# Patient Record
Sex: MALE | Race: BLACK OR AFRICAN AMERICAN | NOT HISPANIC OR LATINO | URBAN - METROPOLITAN AREA
[De-identification: names, ages, dates, MRNs, and addresses within clinical notes are randomized per-mention and may not be internally consistent; named-entity substitution may affect disease eponyms.]

---
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
## 2023-02-21 LAB — SICKLE SCREEN: NEGATIVE

## 2023-03-21 ENCOUNTER — FORM ENCOUNTER (OUTPATIENT)
Age: 18
End: 2023-03-21

## 2023-03-22 ENCOUNTER — APPOINTMENT (OUTPATIENT)
Dept: ORTHOPEDIC SURGERY | Facility: CLINIC | Age: 18
End: 2023-03-22
Payer: COMMERCIAL

## 2023-03-22 ENCOUNTER — APPOINTMENT (OUTPATIENT)
Dept: MRI IMAGING | Facility: CLINIC | Age: 18
End: 2023-03-22
Payer: COMMERCIAL

## 2023-03-22 VITALS — BODY MASS INDEX: 22.19 KG/M2 | WEIGHT: 155 LBS | HEIGHT: 70 IN

## 2023-03-22 PROCEDURE — 99204 OFFICE O/P NEW MOD 45 MIN: CPT

## 2023-03-22 PROCEDURE — 73221 MRI JOINT UPR EXTREM W/O DYE: CPT | Mod: LT

## 2023-03-22 PROCEDURE — 73030 X-RAY EXAM OF SHOULDER: CPT | Mod: LT

## 2023-03-22 RX ORDER — SENNOSIDES 8.6 MG TABLETS 8.6 MG/1
8.6 TABLET ORAL
Qty: 30 | Refills: 5 | Status: DISCONTINUED | COMMUNITY
Start: 2017-06-29 | End: 2023-03-22

## 2023-03-22 RX ORDER — POLYETHYLENE GLYCOL 3350 17 G/17G
17 POWDER, FOR SOLUTION ORAL
Qty: 238 | Refills: 5 | Status: DISCONTINUED | COMMUNITY
Start: 2017-06-29 | End: 2023-03-22

## 2023-03-27 ENCOUNTER — APPOINTMENT (OUTPATIENT)
Dept: ORTHOPEDIC SURGERY | Facility: CLINIC | Age: 18
End: 2023-03-27
Payer: COMMERCIAL

## 2023-03-27 VITALS — WEIGHT: 155 LBS | HEIGHT: 70 IN | BODY MASS INDEX: 22.19 KG/M2

## 2023-03-27 PROCEDURE — 99214 OFFICE O/P EST MOD 30 MIN: CPT

## 2023-03-27 NOTE — HISTORY OF PRESENT ILLNESS
[de-identified] : Patient is here for a new injury to the left shoulder. Patient dislocated his shoulder on Monday twice during practice. The first dislocation was set back in place after he fell on it and the second dislocation was from another fall. April . Patient notes he is not in too much pain and has been in the sling since the injury. Patient went to the hospital and had the shoulder set at [facility] the day of the injury.

## 2023-03-27 NOTE — DISCUSSION/SUMMARY
[Medication Risks Reviewed] : Medication risks reviewed [de-identified] : Reviewed patients X-Ray left shoulder - benign. \par Recommend the patient obtain STAT MRI left shoulder to evaluate labral and Bankart tearing. Follow up after MRI to possibly rule out surgical pathology and discuss future treatment options. \par Patient may condition and play non-contact at this time, will discuss returning to contact sport. \par No throwing for one month. \par Start physical therapy. \par Prescribed patient Queta brace - will try to obtain today\par \par Prescribed patient motrin 600mgs and discussed risks of side effects and timing and management of medication. Side effects include but are not limited to gi ulcers and irritation, as well as kidney failure and bleeding issues. \par \par Since this is the patients first dislocation episode will hold off surgery and will try all conservative treatment options including physical therapy, nsaids, and ice. Advised the patient the increased risk of needing surgery if the shoulder continues to dislocate. Recurring dislocation will cause further cartilage damage which eventually will need surgical intervention.

## 2023-03-27 NOTE — PHYSICAL EXAM
[NL (0-180)] : full active abduction 0-180 degrees [NL (0-70)] : full internal rotation 0-70 degrees [NL (0-90)] : full external rotation 0-90 degrees [5 ___] : forward flexion 5[unfilled]/5 [4___] : external rotation 4[unfilled]/5 [5___] : internal rotation 5[unfilled]/5 [Left] : left shoulder [There are no fractures, subluxations or dislocations. No significant abnormalities are seen] : There are no fractures, subluxations or dislocations. No significant abnormalities are seen [] : no deformity [de-identified] : +Anterior Lachman \par + biceps sign

## 2023-03-28 NOTE — DISCUSSION/SUMMARY
[Medication Risks Reviewed] : Medication risks reviewed [Surgical risks reviewed] : Surgical risks reviewed [de-identified] : discussed risks of early surgery for bankhart repair vs delayed surgery if second dislocation, surgery high risk and morbidity and in season so no rush\par Discussed risks of potential surgery. However, due to the risks of the surgery, we will try NSAIDs and therapy. Discussed management of medication.\par The risks and benefits of surgery have been discussed. Risks include but are not limited to bleeding, infection, reaction to anesthesia, injury to blood vessels and nerves, malunion, nonunion, DVT, PE, necessity of repeat surgery, chronic pain, loss of limb and death. The patient understands the risks and has chosen to proceed with conservative care. All questions have been answered.\par will get into rehab\par Prescribed patient motrin 600mgs and discussed risks of side effects and timing and management of medication. Side effects include but are not limited to gi ulcers and irritation, as well as kidney failure and bleeding issues.\par

## 2023-03-28 NOTE — HISTORY OF PRESENT ILLNESS
[de-identified] : Patient is here to follow up on MRI for left shoulder. Currently doing PT 3x a week at [facility], and finds it does assist. Notes no improvement thus far. Wears sling daily. Takes Tylenol nightly.

## 2023-03-28 NOTE — DATA REVIEWED
[MRI] : MRI [Left] : left [Shoulder] : shoulder [Report was reviewed and noted in the chart] : The report was reviewed and noted in the chart [I independently reviewed and interpreted images and report] : I independently reviewed and interpreted images and report [I reviewed the films/CD] : I reviewed the films/CD [FreeTextEntry1] : s/p dislocation with hills sachs lesion, extensive labral tearing

## 2023-03-28 NOTE — PHYSICAL EXAM
[NL (0-180)] : full active abduction 0-180 degrees [NL (0-70)] : full internal rotation 0-70 degrees [NL (0-90)] : full external rotation 0-90 degrees [5 ___] : forward flexion 5[unfilled]/5 [4___] : external rotation 4[unfilled]/5 [5___] : internal rotation 5[unfilled]/5 [] : motor and sensory intact distally [Left] : left shoulder [There are no fractures, subluxations or dislocations. No significant abnormalities are seen] : There are no fractures, subluxations or dislocations. No significant abnormalities are seen [de-identified] : +Anterior Lachman \par + biceps sign

## 2023-03-29 ENCOUNTER — APPOINTMENT (OUTPATIENT)
Dept: ORTHOPEDIC SURGERY | Facility: CLINIC | Age: 18
End: 2023-03-29

## 2023-04-19 ENCOUNTER — APPOINTMENT (OUTPATIENT)
Dept: ORTHOPEDIC SURGERY | Facility: CLINIC | Age: 18
End: 2023-04-19
Payer: COMMERCIAL

## 2023-04-19 VITALS — HEIGHT: 70 IN | BODY MASS INDEX: 22.19 KG/M2 | WEIGHT: 155 LBS

## 2023-04-19 PROCEDURE — 99214 OFFICE O/P EST MOD 30 MIN: CPT

## 2023-04-25 NOTE — PHYSICAL EXAM
[Left] : left shoulder [NL (0-180)] : full active abduction 0-180 degrees [NL (0-70)] : full internal rotation 0-70 degrees [NL (0-90)] : full external rotation 0-90 degrees [Sitting] : sitting [5 ___] : forward flexion 5[unfilled]/5 [5___] : internal rotation 5[unfilled]/5 [] : no deformity

## 2023-04-25 NOTE — HISTORY OF PRESENT ILLNESS
[de-identified] : Patient is here for a follow up on the left shoulder. Patient notes he is feeling slightly better but still notes some issues with it. Patient claims therapy is fine he does it at school. Patient notes pain anteriorly of joint that radiates down to his elbow. Patient started playing soccer Monday but is limited in physicality. Patient has a game this weekend. Patient notes it feels okay when he's playing besides some pain when jumping.

## 2023-04-25 NOTE — DISCUSSION/SUMMARY
[Medication Risks Reviewed] : Medication risks reviewed [Surgical risks reviewed] : Surgical risks reviewed [de-identified] : \par The patient reports gradual, interval improvement from course of formal rehab. \par Continue physical therapy. \par The patient is cleared for sports, gradually advancing activity as tolerated. \par Discussed risks of further injury and early surgery for Bankart repair vs delayed surgery if second dislocation. If the patient dislocates again, he is at higher risk for surgery and cartilage damage. \par Follow up 4 weeks \par \par The risks and benefits of surgery have been discussed. Risks include but are not limited to bleeding, infection, reaction to anesthesia, injury to blood vessels and nerves, malunion, nonunion, DVT, PE, necessity of repeat surgery, chronic pain, loss of limb and death. The patient understands the risks and has chosen to proceed with conservative care. All questions have been answered.\par \par \par Prescribed patient motrin 600mgs and discussed risks of side effects and timing and management of medication. Side effects include but are not limited to gi ulcers and irritation, as well as kidney failure and bleeding issues.\par

## 2023-05-17 ENCOUNTER — APPOINTMENT (OUTPATIENT)
Dept: ORTHOPEDIC SURGERY | Facility: CLINIC | Age: 18
End: 2023-05-17
Payer: COMMERCIAL

## 2023-05-17 VITALS — BODY MASS INDEX: 22.19 KG/M2 | HEIGHT: 70 IN | WEIGHT: 155 LBS

## 2023-05-17 PROCEDURE — 99214 OFFICE O/P EST MOD 30 MIN: CPT

## 2023-05-24 NOTE — DISCUSSION/SUMMARY
[Medication Risks Reviewed] : Medication risks reviewed [Surgical risks reviewed] : Surgical risks reviewed [de-identified] : \par The patient reports gradual, interval improvement from continued formal rehab. \par Continue physical therapy. \par The patient is cleared for sports, gradually advancing activity as tolerated. \par Discussed risks of further injury and early surgery for Bankart repair vs delayed surgery if second dislocation. He is stable, however if he dislocates again, he is at higher risk for surgery and cartilage damage. discussed risks and benefits of early surgery as well as risks of late bankhart repair, high morbidity so understand if he wants to wait\par Follow up if any symptoms\par \par Prescribed patient motrin 600mgs and discussed risks of side effects and timing and management of medication. Side effects include but are not limited to gi ulcers and irritation, as well as kidney failure and bleeding issues.\par 30 mins face to face time\par

## 2023-05-24 NOTE — HISTORY OF PRESENT ILLNESS
[de-identified] : Patient is here to follow up on left shoulder. [facility]. Notes improvement, ROM is progressing. Pain still worsens with extending. Currently playing soccer, and experiences no pain. ([facility]).

## 2023-06-13 ENCOUNTER — APPOINTMENT (OUTPATIENT)
Dept: INTERNAL MEDICINE | Facility: CLINIC | Age: 18
End: 2023-06-13
Payer: COMMERCIAL

## 2023-06-13 VITALS
BODY MASS INDEX: 22.48 KG/M2 | WEIGHT: 157 LBS | SYSTOLIC BLOOD PRESSURE: 130 MMHG | TEMPERATURE: 98.3 F | OXYGEN SATURATION: 98 % | HEART RATE: 73 BPM | DIASTOLIC BLOOD PRESSURE: 68 MMHG | HEIGHT: 70 IN

## 2023-06-13 VITALS — DIASTOLIC BLOOD PRESSURE: 60 MMHG | SYSTOLIC BLOOD PRESSURE: 90 MMHG

## 2023-06-13 PROCEDURE — 99213 OFFICE O/P EST LOW 20 MIN: CPT

## 2023-06-13 NOTE — ASSESSMENT
[FreeTextEntry1] : Shoulder improving.\par No abdominal pain. \par Maintains good exercise tolerance with no dyspnea or chest pain. \par Titers and TB screen today for school forms, patient will  when completed.  \par

## 2023-06-13 NOTE — HISTORY OF PRESENT ILLNESS
[de-identified] : 17 y.o. male, PMHx lymphatic malformation, idiopathic angioedema. Presents for follow up visit.  \par CPE with PCP 9 months ago.  \par Feeling overall well today.  \par Dislocated his left shoulder playing soccer several months ago, improving.  Continues PT and ortho f/u.\par A/I eval for urticaria.  PRN antihistamines, has had nothing in a while.  \par

## 2023-06-13 NOTE — PHYSICAL EXAM
[Well-Appearing] : well-appearing [No Acute Distress] : no acute distress [Normal Voice/Communication] : normal voice/communication [No Edema] : there was no peripheral edema [Normal Supraclavicular Nodes] : no supraclavicular lymphadenopathy [Coordination Grossly Intact] : coordination grossly intact [No Focal Deficits] : no focal deficits [Normal] : affect was normal and insight and judgment were intact [Normal Gait] : normal gait

## 2023-06-16 LAB
HBV SURFACE AB SERPL IA-ACNC: <3 MIU/ML
M TB IFN-G BLD-IMP: NEGATIVE
MEV IGG FLD QL IA: <5 AU/ML
MEV IGG+IGM SER-IMP: NEGATIVE
MUV AB SER-ACNC: POSITIVE
MUV IGG SER QL IA: 11 AU/ML
QUANTIFERON TB PLUS MITOGEN MINUS NIL: >10 IU/ML
QUANTIFERON TB PLUS NIL: 0.03 IU/ML
QUANTIFERON TB PLUS TB1 MINUS NIL: -0.01 IU/ML
QUANTIFERON TB PLUS TB2 MINUS NIL: -0.01 IU/ML
RUBV IGG FLD-ACNC: 1.5 INDEX
RUBV IGG SER-IMP: POSITIVE
VZV AB TITR SER: NEGATIVE
VZV IGG SER IF-ACNC: 94.5 INDEX

## 2023-06-25 ENCOUNTER — EMERGENCY (EMERGENCY)
Facility: HOSPITAL | Age: 18
LOS: 0 days | Discharge: HOME | End: 2023-06-25
Attending: STUDENT IN AN ORGANIZED HEALTH CARE EDUCATION/TRAINING PROGRAM
Payer: COMMERCIAL

## 2023-06-25 VITALS
DIASTOLIC BLOOD PRESSURE: 71 MMHG | OXYGEN SATURATION: 98 % | RESPIRATION RATE: 18 BRPM | TEMPERATURE: 98 F | SYSTOLIC BLOOD PRESSURE: 115 MMHG | WEIGHT: 154.98 LBS | HEART RATE: 84 BPM | HEIGHT: 69 IN
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
## 2023-06-25 DIAGNOSIS — Z23 ENCOUNTER FOR IMMUNIZATION: ICD-10-CM

## 2023-06-25 DIAGNOSIS — S80.212A ABRASION, LEFT KNEE, INITIAL ENCOUNTER: ICD-10-CM

## 2023-06-25 DIAGNOSIS — S50.312A ABRASION OF LEFT ELBOW, INITIAL ENCOUNTER: ICD-10-CM

## 2023-06-25 DIAGNOSIS — S50.311A ABRASION OF RIGHT ELBOW, INITIAL ENCOUNTER: ICD-10-CM

## 2023-06-25 DIAGNOSIS — S70.311A ABRASION, RIGHT THIGH, INITIAL ENCOUNTER: ICD-10-CM

## 2023-06-25 DIAGNOSIS — M79.651 PAIN IN RIGHT THIGH: ICD-10-CM

## 2023-06-25 PROCEDURE — 99284 EMERGENCY DEPT VISIT MOD MDM: CPT

## 2023-06-25 RX ORDER — TETANUS TOXOID, REDUCED DIPHTHERIA TOXOID AND ACELLULAR PERTUSSIS VACCINE, ADSORBED 5; 2.5; 8; 8; 2.5 [IU]/.5ML; [IU]/.5ML; UG/.5ML; UG/.5ML; UG/.5ML
0.5 SUSPENSION INTRAMUSCULAR ONCE
Refills: 0 | Status: COMPLETED | OUTPATIENT
Start: 2023-06-25 | End: 2023-06-25

## 2023-06-25 RX ADMIN — TETANUS TOXOID, REDUCED DIPHTHERIA TOXOID AND ACELLULAR PERTUSSIS VACCINE, ADSORBED 0.5 MILLILITER(S): 5; 2.5; 8; 8; 2.5 SUSPENSION INTRAMUSCULAR at 14:23

## 2023-06-25 NOTE — ED PROVIDER NOTE - CROS ED ROS STATEMENT
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  Hemo cults were negative  Started on oral iron with improvement in energy  Review of Systems    Constitutional: recent 25 lbs in past 6 months, intentional  lb weight loss, but not feeling poorly and not feeling tired  Eyes: No complaints of eye pain, no red eyes, no eyesight problems, no discharge, no dry eyes, no itching of eyes  The patient presents with complaints of nosebleeds (mostly in the winter  Better since using humidified  Had been going on for few months Nov-March 2016    Right nare more than left  ENT attributed to dry air  )  Cardiovascular: No complaints of slow heart rate, no fast heart rate, no chest pain, no palpitations, no leg claudication, no lower extremity edema  Respiratory: shortness of breath during exertion  Gastrointestinal: constipation, but no nausea, no vomiting and no blood in stools  Genitourinary: No complaints of dysuria, no incontinence, no pelvic pain, no dysmenorrhea, no vaginal discharge or bleeding  Musculoskeletal: No complaints of arthralgias, no myalgias, no joint swelling or stiffness, no limb pain or swelling  Integumentary: No complaints of skin rash or lesions, no itching, no skin wounds, no breast pain or lump  Neurological: No complaints of headache, no confusion, no convulsions, no numbness, no dizziness or fainting, no tingling, no limb weakness, no difficulty walking  Psychiatric: Not suicidal, no sleep disturbance, no anxiety or depression, no change in personality, no emotional problems  Endocrine: No complaints of proptosis, no hot flashes, no muscle weakness, no deepening of the voice, no feelings of weakness     Hematologic/Lymphatic: No complaints of swollen glands, no swollen glands in the neck, does not bleed easily, does not bruise easily  Active Problems    1  Arthritis (716 90) (M19 90)   2  Cholelithiases (574 20) (K80 20)   3  Congestive heart failure (428 0) (I50 9)   4  Diabetes mellitus (250 00) (E11 9)   5  Heart attack (410 90) (I21 3)   6  Heart disease (429 9) (I51 9)   7  High blood pressure (401 9) (I10)   8  Hypothyroidism (244 9) (E03 9)   9  Kidney disease (593 9) (N28 9)   10  Umbilical hernia without obstruction and without gangrene (553 1) (K42 9)    Past Medical History    · Arthritis (716 90) (M19 90)    Surgical History    · History of CABG   · History of Cataract Surgery   · History of Heart Valve Replacement   · History of Pacemaker Placement   · History of PTA Subclavian Artery Right    The surgical history was reviewed and updated today  Family History    · Family history of malignant neoplasm of breast (V16 3) (Z80 3)    The family history was reviewed and updated today  Social History    · Always uses seat belt   · Never a smoker   · No drug use   · Occasional alcohol use   · Retired   · Single   · Social alcohol use (Z78 9)  The social history was reviewed and updated today  Current Meds   1  Caltrate 600 Plus-Vit D TABS; TAKE 1 TABLET DAILY AS DIRECTED; Therapy: (Recorded:76Wnq1566) to Recorded   2  Centrum TABS; TAKE 1 TABLET EVERY 12 HOURS; Therapy: (Recorded:69Pfj2801) to Recorded   3  Diovan 80 MG Oral Tablet; TAKE 1 TABLET DAILY; Therapy: (Recorded:82Scg7011) to Recorded   4  Furosemide 80 MG Oral Tablet; TAKE 1 TABLET DAILY AS DIRECTED; Therapy: (Recorded:42Pac7305) to Recorded   5  Iron TABS; TAKE 1 TABLET DAILY AS DIRECTED; Therapy: (Recorded:77Ich3222) to Recorded   6  Lantus 100 UNIT/ML Subcutaneous Solution; INJECT SUBCUTANEOUSLY AS   DIRECTED; Therapy: (Recorded:02Tbi2529) to Recorded   7  Metolazone 5 MG Oral Tablet; 1/2 tab daily; Therapy: (Recorded:13Nql7789) to Recorded   8  Omega-3 & Omega-6 Fish Oil Oral Capsule; One Daily; Therapy: (Recorded:22Bfm3247) to Recorded   9  OxyCODONE HCl - 5 MG Oral Tablet; take 1 tablet every 8 hours prn severe pain; Therapy: (Recorded:08Jul2016) to Recorded   10  PriLOSEC 20 MG Oral Capsule Delayed Release; TAKE ONE CAPSULE BY MOUTH    EVERY DAY; Therapy: (Recorded:08Jul2016) to Recorded   11  Simvastatin 20 MG Oral Tablet; TAKE 1 TABLET AT BEDTIME; Therapy: (Recorded:08Jul2016) to Recorded   12  Synthroid 137 MCG Oral Tablet; TAKE 1 TABLET DAILY AS DIRECTED; Therapy: (Recorded:08Jul2016) to Recorded   13  Xarelto 15 MG Oral Tablet; One tab daily with an evening meal;    Therapy: (Recorded:08Jul2016) to Recorded    Allergies    1  Adhesive Tape TAPE   2  Ibuprofen CAPS   3  Penicillins    Vitals   Recorded: 37SJK5468 97:67YN   Systolic 251   Diastolic 62   Heart Rate 75   Respiration 16   Temperature 96 5 F   Pain Scale 0   O2 Saturation 92   Height 5 ft 5 in   Weight 246 lb 4 oz   BMI Calculated 40 98   BSA Calculated 2 16     Physical Exam    Constitutional   General appearance: No acute distress, well appearing and well nourished  Eyes   Conjunctiva and lids: No swelling, erythema or discharge  Ears, Nose, Mouth, and Throat   External inspection of ears and nose: Normal     Oropharynx: Normal with no erythema, edema, exudate or lesions  Pulmonary   Auscultation of lungs: Clear to auscultation  Cardiovascular   Auscultation of heart: Normal rate and rhythm, normal S1 and S2, without murmurs  Examination of extremities for edema and/or varicosities: Normal     Abdomen   Abdomen: Non-tender, no masses  Liver and spleen: Abnormal   â¢ Large umbilical Hernia  Lymphatic   Palpation of lymph nodes in neck: No lymphadenopathy  Musculoskeletal   Gait and station: Normal     Skin   Skin and subcutaneous tissue: Normal without rashes or lesions  Neurologic   Cranial nerves: Cranial nerves 2-12 intact      Psychiatric Orientation to person, place, and time: Normal          Assessment    1  Family history of malignant neoplasm of breast (V16 3) (Z80 3) : Maternal Aunt   2  Social alcohol use (Z78 9)    Plan  Anemia    · Drink plenty of fluids ; Status:Complete;   Done: 94RCH6796   Ordered; Dipak Roxana; Ordered By:Thao Shin;   · Follow-up visit in 2 months Evaluation and Treatment  Follow-up  Status: Hold For -  Scheduling  Requested for: 43KLM1068   Ordered; For: Anemia; Ordered By: Anthony Flores Performed:  Due: 35KRL8761   · (1) CBC/PLT/DIFF; Status:Active; Requested GQT:20CEF2069;    Perform:[facility]; Due:84Nnp8058;Ordered; Dipak Roxana; Ordered By:Thao Shin;   · (1) CELIAC DISEASE AB PROFILE; Status:Active; Requested HLN:20KMR8942;    Perform:[facility]; Due:91Hww2126;Ordered; Dipak Roxana; Ordered By:Thao Shin;   · (1) FERRITIN; Status:Active; Requested MXY:73TCW0025;    Perform:[facility]; Due:23Eob4984;Ordered; Dipak Roxana; Ordered By:Marino Shin;   · (1) FREE LIGHT CHAINS, SERUM; Status:Active; Requested EJC:93BCN5354;    Perform:[facility]; Due:56Oud1814;Ordered; Dipak Roxana; Ordered By:Marino Shin;   · (1) IRON SATURATION %, TIBC; Status:Active; Requested CUE:11CVQ3754;    Perform:[facility]; Due:00Lua2459;Ordered; Dipak Roxana; Ordered By:Marino Shin;   · (1) PROTEIN ELECTRO, SERUM; Status:Active; Requested :30GYM3053;    Perform:[facility]; Due:03Wpg7809;Ordered; Dipak Roxana; Ordered By:Thao Shin;   · (1) VITAMIN B12; Status:Active; Requested OCQ:57KNH5476;    Perform:[facility]; Due:90Toj0945;Ordered; Dipak Schmidt; Ordered By:Thao Shin;    Discussion/Summary    In summary, this is a 75-year-old female history of anemia as outlined  While we do not have documentation of prior iron deficiency her excellent clinical response to oral iron replacement is certainly suggestive in this regard    I agree that she's had a relatively comprehensive workup including EGD and colonoscopy  Hemoccults were negative and capsule endoscopy could be deferred on this basis  Further, her hemoglobin has increased to current value of 12 0 with normalization of MCV compared to her baseline of approximately 9 g in February this year  Again, based on the above behavior, I would say that it is reasonable for her to proceed with upcoming cholecystectomy and umbilical hernia repair  Evaluation of other potential causes of anemia can be pursued  Thereafter  Differential diagnosis for contributors could include duodenal process such as inflammatory bowel disease, celiac, blood loss through small bowel source, or previous and nasal bleeding as outlined  I reviewed the above considerations with the patient  She voiced understanding and agreement  The treatment plan was reviewed with the patient/guardian  The patient/guardian understands and agrees with the treatment plan   The patient was counseled regarding diagnostic results, instructions for management, patient and family education, impressions  total time of encounter was 40 minutes        Signatures   Electronically signed by : SIDNEY Rachel ,DO; Jul 25 2016 12:39PM EST                       (Author) all other ROS negative except as per HPI

## 2023-06-25 NOTE — ED PROVIDER NOTE - PATIENT PORTAL LINK FT
You can access the FollowMyHealth Patient Portal offered by [facility] by registering at the following website: [email or web address]. By joining [employer]’s FollowMyHealth portal, you will also be able to view your health information using other applications (apps) compatible with our system.
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
## 2023-06-25 NOTE — ED ADULT NURSE NOTE - NSFALLUNIVINTERV_ED_ALL_ED
Bed/Stretcher in lowest position, wheels locked, appropriate side rails in place/Call bell, personal items and telephone in reach/Instruct patient to call for assistance before getting out of bed/chair/stretcher/Non-slip footwear applied when patient is off stretcher/[place] to call system/Physically safe environment - no spills, clutter or unnecessary equipment/Purposeful proactive rounding/Room/bathroom lighting operational, light cord in reach

## 2023-06-25 NOTE — ED PROVIDER NOTE - NSFOLLOWUPINSTRUCTIONS_ED_ALL_ED_FT
1) Follow up with your doctor this week as needed  2) Return to the ED immediately for new or worsening symptoms  3) Please continue to take any home medications as prescribed  4) Please clean your wounds with soap and water well when you get home  5) Please take Tylenol 975 mg every 6 hours as needed for pain. Please do not exceed more than 4,000mg of Tylenol in a day   6) Please take Motrin 600 mg by mouth every 6 hours as needed for pain. Please take this medication with food  7) Rest, ice, elevate your injured extremities as instructed    Contusion    A contusion is a deep bruise. Contusions are the result of a blunt injury to tissues and muscle fibers under the skin. The skin overlying the contusion may turn blue, purple, or yellow. Symptoms also include pain and swelling in the injured area.    SEEK IMMEDIATE MEDICAL CARE IF YOU HAVE ANY OF THE FOLLOWING SYMPTOMS: severe pain, numbness, tingling, pain, weakness, or skin color/temperature change in any part of your body distal to the injury.

## 2023-06-25 NOTE — ED PROVIDER NOTE - CLINICAL SUMMARY MEDICAL DECISION MAKING FREE TEXT BOX
16 y/o M w/ PMH as above presenting w/ abrasions and R leg pain. Pt overall well appearing, no acute distress. Wounds abrasions only, no suture repair needed. Suspect injuries superficial, do not suspect bony pathology at this time. No other injuries noted on exam. Will update tetanus per pt's request. Recommended topical abx ointment, but no further ED intervention needed at this time. Recommended rest, ice, elevation of injured extremities as well. Return precautions provided, will DC after tetanus.

## 2023-06-25 NOTE — ED PROVIDER NOTE - PHYSICAL EXAMINATION
Gen: NAD, AOx3, able to make needs known, non-toxic  Head: NCAT  HEENT: EOMI, oral mucosa moist, normal conjunctiva  Lung: CTAB, no respiratory distress, no wheezes/rhonchi/rales B/L, speaking in full sentences  CV: RRR, no murmurs  Abd: non distended, soft, nontender, no guarding  MSK: no visible deformities. no midline spinal tenderness. FROM x4 extremities  Neuro: Appears non focal  Skin: Warm, well perfused. small abrasion to R elbow, small abrasion to L elbow, small abrasion to L knee. abrasion w/ associated edema and tenderness to R inner thigh  Psych: normal affect
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
## 2023-07-14 ENCOUNTER — APPOINTMENT (OUTPATIENT)
Dept: INTERNAL MEDICINE | Facility: CLINIC | Age: 18
End: 2023-07-14
Payer: COMMERCIAL

## 2023-07-14 VITALS
SYSTOLIC BLOOD PRESSURE: 110 MMHG | DIASTOLIC BLOOD PRESSURE: 68 MMHG | HEART RATE: 66 BPM | BODY MASS INDEX: 23.62 KG/M2 | HEIGHT: 70 IN | RESPIRATION RATE: 15 BRPM | TEMPERATURE: 98 F | OXYGEN SATURATION: 98 % | WEIGHT: 165 LBS

## 2023-07-14 PROCEDURE — 90472 IMMUNIZATION ADMIN EACH ADD: CPT

## 2023-07-14 PROCEDURE — 90707 MMR VACCINE SC: CPT

## 2023-07-14 PROCEDURE — 90471 IMMUNIZATION ADMIN: CPT

## 2023-07-14 PROCEDURE — 90716 VAR VACCINE LIVE SUBQ: CPT

## 2023-07-20 ENCOUNTER — RESULT REVIEW (OUTPATIENT)
Age: 18
End: 2023-07-20

## 2023-08-31 ENCOUNTER — APPOINTMENT (OUTPATIENT)
Dept: INTERNAL MEDICINE | Facility: CLINIC | Age: 18
End: 2023-08-31
Payer: COMMERCIAL

## 2023-08-31 VITALS
TEMPERATURE: 98 F | DIASTOLIC BLOOD PRESSURE: 64 MMHG | SYSTOLIC BLOOD PRESSURE: 120 MMHG | HEIGHT: 70 IN | HEART RATE: 68 BPM | BODY MASS INDEX: 23.19 KG/M2 | WEIGHT: 162 LBS | OXYGEN SATURATION: 99 %

## 2023-08-31 DIAGNOSIS — Q89.9 CONGENITAL MALFORMATION, UNSPECIFIED: ICD-10-CM

## 2023-08-31 DIAGNOSIS — S43.432A SUPERIOR GLENOID LABRUM LESION OF LEFT SHOULDER, INITIAL ENCOUNTER: ICD-10-CM

## 2023-08-31 DIAGNOSIS — Z01.84 ENCOUNTER FOR ANTIBODY RESPONSE EXAMINATION: ICD-10-CM

## 2023-08-31 DIAGNOSIS — M25.562 PAIN IN RIGHT KNEE: ICD-10-CM

## 2023-08-31 DIAGNOSIS — M25.561 PAIN IN RIGHT KNEE: ICD-10-CM

## 2023-08-31 DIAGNOSIS — Z87.19 PERSONAL HISTORY OF OTHER DISEASES OF THE DIGESTIVE SYSTEM: ICD-10-CM

## 2023-08-31 DIAGNOSIS — S80.11XA CONTUSION OF RIGHT LOWER LEG, INITIAL ENCOUNTER: ICD-10-CM

## 2023-08-31 DIAGNOSIS — S43.005D UNSPECIFIED DISLOCATION OF LEFT SHOULDER JOINT, SUBSEQUENT ENCOUNTER: ICD-10-CM

## 2023-08-31 DIAGNOSIS — M92.522 JUVENILE OSTEOCHONDROSIS OF TIBIA TUBERCLE, LEFT LEG: ICD-10-CM

## 2023-08-31 DIAGNOSIS — Z87.2 PERSONAL HISTORY OF DISEASES OF THE SKIN AND SUBCUTANEOUS TISSUE: ICD-10-CM

## 2023-08-31 DIAGNOSIS — T78.3XXA ANGIONEUROTIC EDEMA, INITIAL ENCOUNTER: ICD-10-CM

## 2023-08-31 PROCEDURE — 99395 PREV VISIT EST AGE 18-39: CPT | Mod: 25

## 2023-08-31 PROCEDURE — 90686 IIV4 VACC NO PRSV 0.5 ML IM: CPT

## 2023-08-31 PROCEDURE — G0008: CPT

## 2023-08-31 NOTE — ASSESSMENT
[FreeTextEntry1] : 17 y.o. male, PMHx lymphatic malformation, idiopathic angioedema. Presents for CPE/wellness visit.  Shoulder improving. No abdominal pain.  No recurrence of urticaria or angioedema.   Maintains good exercise tolerance with no dyspnea or chest pain.  Flu shot today for school   HM: Dental UTD  tdap UTD - 2015 Flu shot today  COVID vaccine done with booster  labs reviewed, no repeat today

## 2023-08-31 NOTE — PHYSICAL EXAM
[No Acute Distress] : no acute distress [Well-Appearing] : well-appearing [Normal Voice/Communication] : normal voice/communication [Pedal Pulses Present] : the pedal pulses are present [No Edema] : there was no peripheral edema [Normal Supraclavicular Nodes] : no supraclavicular lymphadenopathy [Coordination Grossly Intact] : coordination grossly intact [No Focal Deficits] : no focal deficits [Normal Gait] : normal gait [Normal] : affect was normal and insight and judgment were intact

## 2023-08-31 NOTE — HISTORY OF PRESENT ILLNESS
[de-identified] : 17 y.o. male, PMHx lymphatic malformation, idiopathic angioedema. Presents for CPE/wellness visit.    Dislocated his left shoulder playing soccer several months ago, improving.  Still with occasional discomfort, feels fine when playing soccer, wears brace as advised by PT. Continues PT when time allows.    A/I eval for urticaria/angioedema.  PRN antihistamines, has had nothing in a while.

## 2023-08-31 NOTE — HEALTH RISK ASSESSMENT
[Good] : ~his/her~  mood as  good [No] : In the past 12 months have you used drugs other than those required for medical reasons? No [0] : 2) Feeling down, depressed, or hopeless: Not at all (0) [PHQ-2 Negative - No further assessment needed] : PHQ-2 Negative - No further assessment needed [With Family] : lives with family [Student] : student [Never] : Never [de-identified] : sports  [de-identified] : regular  [HCG6Gxoef] : 0 [Sexually Active] : not sexually active [Reports changes in hearing] : Reports no changes in hearing [Reports changes in vision] : Reports no changes in vision [Reports changes in dental health] : Reports no changes in dental health

## 2023-10-01 ENCOUNTER — OUTPATIENT (OUTPATIENT)
Dept: OUTPATIENT SERVICES | Facility: HOSPITAL | Age: 18
LOS: 1 days | End: 2023-10-01
Payer: COMMERCIAL

## 2023-10-01 ENCOUNTER — APPOINTMENT (OUTPATIENT)
Dept: MRI IMAGING | Facility: CLINIC | Age: 18
End: 2023-10-01
Payer: COMMERCIAL

## 2023-10-01 DIAGNOSIS — Z00.8 ENCOUNTER FOR OTHER GENERAL EXAMINATION: ICD-10-CM

## 2023-10-01 PROCEDURE — 74183 MRI ABD W/O CNTR FLWD CNTR: CPT

## 2023-10-01 PROCEDURE — 74183 MRI ABD W/O CNTR FLWD CNTR: CPT | Mod: 26

## 2023-10-01 PROCEDURE — 72197 MRI PELVIS W/O & W/DYE: CPT

## 2023-10-01 PROCEDURE — 72197 MRI PELVIS W/O & W/DYE: CPT | Mod: 26

## 2023-10-01 PROCEDURE — A9585: CPT

## 2023-10-06 ENCOUNTER — TRANSCRIPTION ENCOUNTER (OUTPATIENT)
Age: 18
End: 2023-10-06

## 2023-10-19 NOTE — DISCHARGE NOTE PEDIATRIC - CARE PLAN
Patient would like to know why she has to come in for another office visit she would like a call back to discuss    Thank you    Principal Discharge DX:	Lymphatic malformation  Goal:	Multidisciplinary approach; medical management by hematology/oncology followed by possible IR or operative intervention  Instructions for follow-up, activity and diet:	The patient may resume a regular diet and activity. Take medications as prescribed. If you notice swelling of your legs, any difficulty breathing, chest pain, palpitations, nausea, vomiting or severe abdominal pain, please call the office or come to the ED. Principal Discharge DX:	Lymphatic malformation  Goal:	Multidisciplinary approach; medical management by hematology/oncology followed by possible IR or operative intervention  Instructions for follow-up, activity and diet:	The patient may resume a regular diet and activity. Take Rapamune as prescribed once given the ok to start per Dr Lloyd team. If you notice swelling of your legs, any difficulty breathing, chest pain, palpitations, nausea, vomiting or severe abdominal pain, please call the office or come to the ED. Principal Discharge DX:	Lymphatic malformation  Goal:	Multidisciplinary approach; medical management by hematology/oncology followed by possible IR or operative intervention  Instructions for follow-up, activity and diet:	The patient may resume a regular diet and activity. Take Rapamune as prescribed once given the ok to start per Dr Bello's team. If you notice swelling of your legs, any difficulty breathing, chest pain, palpitations, nausea, vomiting or severe abdominal pain, please call the office or come to the ED.

## 2023-12-01 ENCOUNTER — APPOINTMENT (OUTPATIENT)
Dept: INTERNAL MEDICINE | Facility: CLINIC | Age: 18
End: 2023-12-01
Payer: COMMERCIAL

## 2023-12-01 ENCOUNTER — NON-APPOINTMENT (OUTPATIENT)
Age: 18
End: 2023-12-01

## 2023-12-01 VITALS
HEART RATE: 67 BPM | OXYGEN SATURATION: 97 % | SYSTOLIC BLOOD PRESSURE: 118 MMHG | WEIGHT: 163 LBS | DIASTOLIC BLOOD PRESSURE: 73 MMHG | RESPIRATION RATE: 15 BRPM

## 2023-12-01 PROCEDURE — 99214 OFFICE O/P EST MOD 30 MIN: CPT

## 2023-12-04 ENCOUNTER — TRANSCRIPTION ENCOUNTER (OUTPATIENT)
Age: 18
End: 2023-12-04

## 2023-12-04 LAB
ALBUMIN SERPL ELPH-MCNC: 4.6 G/DL
ALP BLD-CCNC: 64 U/L
ALT SERPL-CCNC: 10 U/L
ANION GAP SERPL CALC-SCNC: 13 MMOL/L
APPEARANCE: CLEAR
AST SERPL-CCNC: 12 U/L
BASOPHILS # BLD AUTO: 0.04 K/UL
BASOPHILS NFR BLD AUTO: 0.7 %
BILIRUB SERPL-MCNC: 0.7 MG/DL
BILIRUBIN URINE: NEGATIVE
BLOOD URINE: NEGATIVE
BUN SERPL-MCNC: 13 MG/DL
CALCIUM SERPL-MCNC: 9.4 MG/DL
CHLORIDE SERPL-SCNC: 101 MMOL/L
CO2 SERPL-SCNC: 25 MMOL/L
COLOR: YELLOW
CREAT SERPL-MCNC: 1.13 MG/DL
EGFR: 96 ML/MIN/1.73M2
EOSINOPHIL # BLD AUTO: 0.09 K/UL
EOSINOPHIL NFR BLD AUTO: 1.6 %
FOLATE SERPL-MCNC: 13.4 NG/ML
GLUCOSE QUALITATIVE U: NEGATIVE MG/DL
GLUCOSE SERPL-MCNC: 85 MG/DL
HCT VFR BLD CALC: 44.6 %
HGB BLD-MCNC: 14.6 G/DL
IMM GRANULOCYTES NFR BLD AUTO: 0.4 %
KETONES URINE: NEGATIVE MG/DL
LEUKOCYTE ESTERASE URINE: NEGATIVE
LYMPHOCYTES # BLD AUTO: 1.84 K/UL
LYMPHOCYTES NFR BLD AUTO: 32.6 %
MAN DIFF?: NORMAL
MCHC RBC-ENTMCNC: 30.7 PG
MCHC RBC-ENTMCNC: 32.7 GM/DL
MCV RBC AUTO: 93.9 FL
MONOCYTES # BLD AUTO: 0.61 K/UL
MONOCYTES NFR BLD AUTO: 10.8 %
NEUTROPHILS # BLD AUTO: 3.04 K/UL
NEUTROPHILS NFR BLD AUTO: 53.9 %
NITRITE URINE: NEGATIVE
PH URINE: 7.5
PLATELET # BLD AUTO: 255 K/UL
POTASSIUM SERPL-SCNC: 4.3 MMOL/L
PROT SERPL-MCNC: 7.5 G/DL
PROTEIN URINE: NEGATIVE MG/DL
RBC # BLD: 4.75 M/UL
RBC # FLD: 12.6 %
SODIUM SERPL-SCNC: 139 MMOL/L
SPECIFIC GRAVITY URINE: 1.02
TSH SERPL-ACNC: 1.35 UIU/ML
UROBILINOGEN URINE: 1 MG/DL
VIT B12 SERPL-MCNC: 522 PG/ML
WBC # FLD AUTO: 5.64 K/UL

## 2023-12-12 ENCOUNTER — APPOINTMENT (OUTPATIENT)
Dept: CARDIOLOGY | Facility: CLINIC | Age: 18
End: 2023-12-12
Payer: COMMERCIAL

## 2023-12-12 ENCOUNTER — NON-APPOINTMENT (OUTPATIENT)
Age: 18
End: 2023-12-12

## 2023-12-12 VITALS
HEART RATE: 73 BPM | SYSTOLIC BLOOD PRESSURE: 120 MMHG | DIASTOLIC BLOOD PRESSURE: 72 MMHG | OXYGEN SATURATION: 100 % | WEIGHT: 159 LBS

## 2023-12-12 DIAGNOSIS — R06.09 OTHER FORMS OF DYSPNEA: ICD-10-CM

## 2023-12-12 DIAGNOSIS — R94.31 ABNORMAL ELECTROCARDIOGRAM [ECG] [EKG]: ICD-10-CM

## 2023-12-12 PROCEDURE — 93000 ELECTROCARDIOGRAM COMPLETE: CPT

## 2023-12-12 PROCEDURE — 99203 OFFICE O/P NEW LOW 30 MIN: CPT

## 2023-12-12 PROCEDURE — 93306 TTE W/DOPPLER COMPLETE: CPT

## 2023-12-13 ENCOUNTER — APPOINTMENT (OUTPATIENT)
Dept: RADIOLOGY | Facility: CLINIC | Age: 18
End: 2023-12-13
Payer: COMMERCIAL

## 2023-12-13 ENCOUNTER — OUTPATIENT (OUTPATIENT)
Dept: OUTPATIENT SERVICES | Facility: HOSPITAL | Age: 18
LOS: 1 days | End: 2023-12-13
Payer: COMMERCIAL

## 2023-12-13 DIAGNOSIS — R06.09 OTHER FORMS OF DYSPNEA: ICD-10-CM

## 2023-12-13 PROCEDURE — 71046 X-RAY EXAM CHEST 2 VIEWS: CPT | Mod: 26

## 2023-12-13 PROCEDURE — 72081 X-RAY EXAM ENTIRE SPI 1 VW: CPT | Mod: 26

## 2023-12-13 PROCEDURE — 71046 X-RAY EXAM CHEST 2 VIEWS: CPT

## 2023-12-13 PROCEDURE — 72081 X-RAY EXAM ENTIRE SPI 1 VW: CPT

## 2023-12-21 ENCOUNTER — APPOINTMENT (OUTPATIENT)
Dept: ORTHOPEDIC SURGERY | Facility: CLINIC | Age: 18
End: 2023-12-21
Payer: COMMERCIAL

## 2023-12-21 VITALS — BODY MASS INDEX: 22.76 KG/M2 | WEIGHT: 159 LBS | HEIGHT: 70 IN

## 2023-12-21 DIAGNOSIS — M41.9 SCOLIOSIS, UNSPECIFIED: ICD-10-CM

## 2023-12-21 PROCEDURE — 99202 OFFICE O/P NEW SF 15 MIN: CPT

## 2023-12-21 NOTE — PHYSICAL EXAM
[de-identified] : On examination the shoulders and pelvis are level.  Forward flexion of the spine he has a left thoracic paravertebral prominence with a smaller left thoracolumbar prominence and is still smaller left lumbar prominence. [de-identified] : I reviewed his outside x-ray that reveals a long right-sided thoracolumbar curve of 8 to 10 degrees.  The iliac crest apophyses are closed.

## 2023-12-21 NOTE — HISTORY OF PRESENT ILLNESS
[de-identified] : This 18-year-old male tells me that that his nurse practitioner sent him for x-rays after finding an abnormality on his exam.  He has an older sister who had mild scoliosis.  He has no complaints of back pain.

## 2023-12-21 NOTE — DISCUSSION/SUMMARY
[de-identified] : He likely has a nonstructural scoliosis as a has or left-sided prominences and a long gentle right-sided curve.  The iliac crest apophyses are closed.  Currently there is no need for any treatment or further follow-up.  All of his questions were answered.

## 2023-12-22 ENCOUNTER — TRANSCRIPTION ENCOUNTER (OUTPATIENT)
Age: 18
End: 2023-12-22

## 2023-12-27 ENCOUNTER — APPOINTMENT (OUTPATIENT)
Dept: CARDIOLOGY | Facility: CLINIC | Age: 18
End: 2023-12-27
Payer: COMMERCIAL

## 2023-12-27 PROCEDURE — 93351 STRESS TTE COMPLETE: CPT

## 2024-01-04 ENCOUNTER — OFFICE (OUTPATIENT)
Facility: LOCATION | Age: 19
Setting detail: OPHTHALMOLOGY
End: 2024-01-04
Payer: COMMERCIAL

## 2024-01-04 DIAGNOSIS — H11.133: ICD-10-CM

## 2024-01-04 DIAGNOSIS — H40.013: ICD-10-CM

## 2024-01-04 DIAGNOSIS — H50.52: ICD-10-CM

## 2024-01-04 DIAGNOSIS — H52.223: ICD-10-CM

## 2024-01-04 DIAGNOSIS — H01.004: ICD-10-CM

## 2024-01-04 DIAGNOSIS — H01.001: ICD-10-CM

## 2024-01-04 PROCEDURE — 92060 SENSORIMOTOR EXAMINATION: CPT | Performed by: OPHTHALMOLOGY

## 2024-01-04 PROCEDURE — 92133 CPTRZD OPH DX IMG PST SGM ON: CPT | Performed by: OPHTHALMOLOGY

## 2024-01-04 PROCEDURE — 92083 EXTENDED VISUAL FIELD XM: CPT | Performed by: OPHTHALMOLOGY

## 2024-01-04 PROCEDURE — 92014 COMPRE OPH EXAM EST PT 1/>: CPT | Performed by: OPHTHALMOLOGY

## 2024-01-04 PROCEDURE — 92015 DETERMINE REFRACTIVE STATE: CPT | Performed by: OPHTHALMOLOGY

## 2024-01-04 ASSESSMENT — REFRACTION_CURRENTRX
OD_VPRISM_DIRECTION: SV
OS_AXIS: 098
OD_AXIS: 111
OD_OVR_VA: 20/
OS_VPRISM_DIRECTION: SV
OD_SPHERE: PLANO
OS_OVR_VA: 20/
OS_SPHERE: PLANO
OD_CYLINDER: -0.50
OS_CYLINDER: -0.25

## 2024-01-04 ASSESSMENT — REFRACTION_MANIFEST
OS_SPHERE: PLANO
OD_SPHERE: PLANO
OD_CYLINDER: -0.50
OS_CYLINDER: -0.25
OD_AXIS: 120
OS_VA1: 20/20
OS_AXIS: 100
OD_VA1: 20/20

## 2024-01-04 ASSESSMENT — CONFRONTATIONAL VISUAL FIELD TEST (CVF)
OD_FINDINGS: FULL
OS_FINDINGS: FULL

## 2024-01-04 ASSESSMENT — LID EXAM ASSESSMENTS
OS_BLEPHARITIS: LUL T 1+
OD_MEIBOMITIS: RLL T 1+
OS_MEIBOMITIS: LLL T 1+
OD_BLEPHARITIS: RUL T 1+

## 2024-01-04 ASSESSMENT — REFRACTION_AUTOREFRACTION
OD_SPHERE: 0.00
OD_AXIS: 121
OS_AXIS: 094
OS_AXIS: 080
OS_SPHERE: +0.25
OS_CYLINDER: -0.25
OD_CYLINDER: -0.50
OD_CYLINDER: -0.25
OD_SPHERE: +0.25
OS_SPHERE: +0.25
OS_CYLINDER: -0.25
OD_AXIS: 132

## 2024-01-04 ASSESSMENT — SPHEQUIV_DERIVED
OS_SPHEQUIV: 0.125
OS_SPHEQUIV: 0.125
OD_SPHEQUIV: 0
OD_SPHEQUIV: -0.125

## 2024-01-05 ENCOUNTER — APPOINTMENT (OUTPATIENT)
Dept: PULMONOLOGY | Facility: CLINIC | Age: 19
End: 2024-01-05
Payer: COMMERCIAL

## 2024-01-05 PROCEDURE — 94726 PLETHYSMOGRAPHY LUNG VOLUMES: CPT

## 2024-01-05 PROCEDURE — 94010 BREATHING CAPACITY TEST: CPT

## 2024-01-05 PROCEDURE — 94729 DIFFUSING CAPACITY: CPT

## 2024-06-27 ENCOUNTER — APPOINTMENT (OUTPATIENT)
Dept: INTERNAL MEDICINE | Facility: CLINIC | Age: 19
End: 2024-06-27
Payer: COMMERCIAL

## 2024-06-27 VITALS
DIASTOLIC BLOOD PRESSURE: 70 MMHG | HEIGHT: 70 IN | SYSTOLIC BLOOD PRESSURE: 103 MMHG | RESPIRATION RATE: 16 BRPM | TEMPERATURE: 98 F | OXYGEN SATURATION: 98 % | BODY MASS INDEX: 22.33 KG/M2 | HEART RATE: 90 BPM | WEIGHT: 156 LBS

## 2024-06-27 DIAGNOSIS — Z00.00 ENCOUNTER FOR GENERAL ADULT MEDICAL EXAMINATION W/OUT ABNORMAL FINDINGS: ICD-10-CM

## 2024-06-27 DIAGNOSIS — Z23 ENCOUNTER FOR IMMUNIZATION: ICD-10-CM

## 2024-06-27 PROCEDURE — 99395 PREV VISIT EST AGE 18-39: CPT

## 2024-06-28 DIAGNOSIS — R79.89 OTHER SPECIFIED ABNORMAL FINDINGS OF BLOOD CHEMISTRY: ICD-10-CM

## 2024-06-28 DIAGNOSIS — R17 UNSPECIFIED JAUNDICE: ICD-10-CM

## 2024-06-28 LAB
ALBUMIN SERPL ELPH-MCNC: 4.8 G/DL
ALP BLD-CCNC: 61 U/L
ALT SERPL-CCNC: 12 U/L
ANION GAP SERPL CALC-SCNC: 15 MMOL/L
AST SERPL-CCNC: 24 U/L
BILIRUB SERPL-MCNC: 1.6 MG/DL
BUN SERPL-MCNC: 15 MG/DL
CALCIUM SERPL-MCNC: 10.1 MG/DL
CHLORIDE SERPL-SCNC: 103 MMOL/L
CHOLEST SERPL-MCNC: 143 MG/DL
CO2 SERPL-SCNC: 24 MMOL/L
CREAT SERPL-MCNC: 1.38 MG/DL
EGFR: 75 ML/MIN/1.73M2
ESTIMATED AVERAGE GLUCOSE: 105 MG/DL
GLUCOSE SERPL-MCNC: 83 MG/DL
HBA1C MFR BLD HPLC: 5.3 %
HBV CORE IGG+IGM SER QL: NONREACTIVE
HBV SURFACE AB SER QL: NONREACTIVE
HBV SURFACE AG SER QL: NONREACTIVE
HCT VFR BLD CALC: 44.3 %
HCV AB SER QL: NONREACTIVE
HCV S/CO RATIO: 0.08 S/CO
HDLC SERPL-MCNC: 36 MG/DL
HGB BLD-MCNC: 14.9 G/DL
HIV1+2 AB SPEC QL IA.RAPID: NONREACTIVE
LDLC SERPL CALC-MCNC: 94 MG/DL
MCHC RBC-ENTMCNC: 31 PG
MCHC RBC-ENTMCNC: 33.6 GM/DL
MCV RBC AUTO: 92.3 FL
NONHDLC SERPL-MCNC: 107 MG/DL
PLATELET # BLD AUTO: 258 K/UL
POTASSIUM SERPL-SCNC: 4.5 MMOL/L
PROT SERPL-MCNC: 8 G/DL
RBC # BLD: 4.8 M/UL
RBC # FLD: 12.5 %
SODIUM SERPL-SCNC: 142 MMOL/L
TRIGL SERPL-MCNC: 65 MG/DL
TSH SERPL-ACNC: 0.89 UIU/ML
WBC # FLD AUTO: 7.49 K/UL

## 2024-07-01 LAB
M TB IFN-G BLD-IMP: NEGATIVE
QUANTIFERON TB PLUS MITOGEN MINUS NIL: 4.3 IU/ML
QUANTIFERON TB PLUS NIL: 0.03 IU/ML
QUANTIFERON TB PLUS TB1 MINUS NIL: 0 IU/ML
QUANTIFERON TB PLUS TB2 MINUS NIL: 0.01 IU/ML

## 2024-10-15 ENCOUNTER — APPOINTMENT (OUTPATIENT)
Dept: ORTHOPEDIC SURGERY | Facility: CLINIC | Age: 19
End: 2024-10-15

## 2024-10-15 VITALS — BODY MASS INDEX: 22.33 KG/M2 | HEIGHT: 70 IN | WEIGHT: 156 LBS

## 2024-10-15 PROCEDURE — 99214 OFFICE O/P EST MOD 30 MIN: CPT

## 2024-10-15 PROCEDURE — 73030 X-RAY EXAM OF SHOULDER: CPT | Mod: LT

## 2024-10-15 NOTE — ED PEDIATRIC NURSE NOTE - NS ED NOTE  FEEL SAFE YN PEDS
Partially impaired: cannot see medication labels or newsprint, but can see obstacles in path, and the surrounding layout; can count fingers at arm's length
unable to assess

## 2024-10-28 ENCOUNTER — APPOINTMENT (OUTPATIENT)
Dept: ORTHOPEDIC SURGERY | Facility: CLINIC | Age: 19
End: 2024-10-28

## 2024-10-28 VITALS — WEIGHT: 156 LBS | BODY MASS INDEX: 22.33 KG/M2 | HEIGHT: 70 IN

## 2024-10-28 PROBLEM — M25.312 INSTABILITY OF LEFT SHOULDER JOINT: Status: ACTIVE | Noted: 2024-10-28

## 2024-10-28 PROCEDURE — 99214 OFFICE O/P EST MOD 30 MIN: CPT

## 2024-11-18 NOTE — ED PROVIDER NOTE - DATE/TIME 1
PDMP and medication dispense history reviewed.  E-prescribe sent for gabapentin 300 mg p.o. 3 times daily #90 with 0 refills, to be released today.  Please inform patient, thank you.   11-Oct-2018 21:38

## 2024-12-19 ENCOUNTER — APPOINTMENT (OUTPATIENT)
Dept: ORTHOPEDIC SURGERY | Facility: AMBULATORY SURGERY CENTER | Age: 19
End: 2024-12-19

## 2024-12-19 PROCEDURE — 29807 SHO ARTHRS SRG RPR SLAP LES: CPT | Mod: LT

## 2024-12-19 PROCEDURE — 29807 SHO ARTHRS SRG RPR SLAP LES: CPT | Mod: AS,LT

## 2024-12-19 PROCEDURE — 29806 SHO ARTHRS SRG CAPSULORRAPHY: CPT | Mod: AS,LT

## 2024-12-19 PROCEDURE — 29823 SHO ARTHRS SRG XTNSV DBRDMT: CPT | Mod: AS,59,LT

## 2024-12-19 PROCEDURE — 29806 SHO ARTHRS SRG CAPSULORRAPHY: CPT | Mod: LT

## 2024-12-19 PROCEDURE — 29823 SHO ARTHRS SRG XTNSV DBRDMT: CPT | Mod: 59,LT

## 2024-12-19 RX ORDER — OXYCODONE AND ACETAMINOPHEN 10; 325 MG/1; MG/1
10-325 TABLET ORAL
Qty: 20 | Refills: 0 | Status: ACTIVE | COMMUNITY
Start: 2024-12-19 | End: 1900-01-01

## 2024-12-26 ENCOUNTER — APPOINTMENT (OUTPATIENT)
Dept: ORTHOPEDIC SURGERY | Facility: CLINIC | Age: 19
End: 2024-12-26

## 2024-12-26 VITALS — WEIGHT: 156 LBS | HEIGHT: 70 IN | BODY MASS INDEX: 22.33 KG/M2

## 2024-12-26 DIAGNOSIS — M25.312 OTHER INSTABILITY, LEFT SHOULDER: ICD-10-CM

## 2024-12-26 DIAGNOSIS — S43.005D UNSPECIFIED DISLOCATION OF LEFT SHOULDER JOINT, SUBSEQUENT ENCOUNTER: ICD-10-CM

## 2024-12-26 PROCEDURE — 99024 POSTOP FOLLOW-UP VISIT: CPT

## 2025-01-06 ENCOUNTER — OFFICE (OUTPATIENT)
Facility: LOCATION | Age: 20
Setting detail: OPHTHALMOLOGY
End: 2025-01-06
Payer: COMMERCIAL

## 2025-01-06 DIAGNOSIS — H01.004: ICD-10-CM

## 2025-01-06 DIAGNOSIS — H40.013: ICD-10-CM

## 2025-01-06 DIAGNOSIS — H01.001: ICD-10-CM

## 2025-01-06 DIAGNOSIS — H50.52: ICD-10-CM

## 2025-01-06 PROCEDURE — 92083 EXTENDED VISUAL FIELD XM: CPT | Performed by: OPHTHALMOLOGY

## 2025-01-06 PROCEDURE — 92060 SENSORIMOTOR EXAMINATION: CPT | Performed by: OPHTHALMOLOGY

## 2025-01-06 PROCEDURE — 92133 CPTRZD OPH DX IMG PST SGM ON: CPT | Performed by: OPHTHALMOLOGY

## 2025-01-06 PROCEDURE — 92014 COMPRE OPH EXAM EST PT 1/>: CPT | Performed by: OPHTHALMOLOGY

## 2025-01-06 ASSESSMENT — KERATOMETRY
OD_K1POWER_DIOPTERS: 41.75
OD_AXISANGLE_DEGREES: 080
OS_K1POWER_DIOPTERS: 41.50
OS_K2POWER_DIOPTERS: 42.25
OD_K2POWER_DIOPTERS: 42.75
OS_AXISANGLE_DEGREES: 092

## 2025-01-06 ASSESSMENT — REFRACTION_CURRENTRX
OD_OVR_VA: 20/
OS_VPRISM_DIRECTION: SV
OD_VPRISM_DIRECTION: SV
OS_OVR_VA: 20/
OS_CYLINDER: -0.25
OD_CYLINDER: -0.50
OS_SPHERE: PLANO
OD_AXIS: 120
OD_SPHERE: PLANO
OS_AXIS: 100

## 2025-01-06 ASSESSMENT — REFRACTION_MANIFEST
OS_AXIS: 100
OS_VA1: 20/20
OD_AXIS: 120
OD_SPHERE: PLANO
OD_CYLINDER: -0.50
OS_CYLINDER: -0.25
OD_VA1: 20/20
OS_SPHERE: PLANO

## 2025-01-06 ASSESSMENT — PACHYMETRY
OS_CT_UM: 574
OD_CT_UM: 584
OD_CT_CORRECTION: -3
OS_CT_CORRECTION: -2

## 2025-01-06 ASSESSMENT — LID EXAM ASSESSMENTS
OS_BLEPHARITIS: LUL T
OD_BLEPHARITIS: RUL T
OS_MEIBOMITIS: LLL T
OD_MEIBOMITIS: RLL T

## 2025-01-06 ASSESSMENT — REFRACTION_AUTOREFRACTION
OD_AXIS: 126
OS_AXIS: 086
OS_CYLINDER: -0.25
OD_CYLINDER: -0.25
OD_CYLINDER: -0.50
OD_AXIS: 129
OS_SPHERE: PLANO
OD_SPHERE: PLANO
OS_SPHERE: +0.25
OD_SPHERE: PLANO
OS_CYLINDER: SPHERE

## 2025-01-06 ASSESSMENT — CONFRONTATIONAL VISUAL FIELD TEST (CVF)
OD_FINDINGS: FULL
OS_FINDINGS: FULL

## 2025-01-06 ASSESSMENT — VISUAL ACUITY
OS_BCVA: 20/20
OD_BCVA: 20/20

## 2025-01-06 ASSESSMENT — TONOMETRY
OS_IOP_MMHG: 17
OD_IOP_MMHG: 19

## 2025-01-14 ENCOUNTER — APPOINTMENT (OUTPATIENT)
Dept: ORTHOPEDIC SURGERY | Facility: CLINIC | Age: 20
End: 2025-01-14
Payer: COMMERCIAL

## 2025-01-14 ENCOUNTER — APPOINTMENT (OUTPATIENT)
Dept: ORTHOPEDIC SURGERY | Facility: CLINIC | Age: 20
End: 2025-01-14

## 2025-01-14 DIAGNOSIS — Z98.890 OTHER SPECIFIED POSTPROCEDURAL STATES: ICD-10-CM

## 2025-01-14 PROCEDURE — 99024 POSTOP FOLLOW-UP VISIT: CPT

## 2025-01-14 PROCEDURE — 73030 X-RAY EXAM OF SHOULDER: CPT | Mod: LT

## 2025-02-07 ENCOUNTER — OUTPATIENT (OUTPATIENT)
Dept: OUTPATIENT SERVICES | Age: 20
LOS: 1 days | Discharge: HOME | End: 2025-02-07

## 2025-02-12 ENCOUNTER — APPOINTMENT (OUTPATIENT)
Dept: ORTHOPEDIC SURGERY | Facility: CLINIC | Age: 20
End: 2025-02-12
Payer: COMMERCIAL

## 2025-02-12 VITALS — WEIGHT: 156 LBS | HEIGHT: 70 IN | BODY MASS INDEX: 22.33 KG/M2

## 2025-02-12 DIAGNOSIS — Z98.890 OTHER SPECIFIED POSTPROCEDURAL STATES: ICD-10-CM

## 2025-02-12 PROCEDURE — 99024 POSTOP FOLLOW-UP VISIT: CPT

## 2025-03-08 NOTE — ED PROVIDER NOTE - NS_ATTENDINGSCRIBE_ED_ALL_ED
I personally performed the service described in the documentation recorded by the scribe in my presence, and it accurately and completely records my words and actions.
08-Mar-2025 08:32

## 2025-03-11 ENCOUNTER — APPOINTMENT (OUTPATIENT)
Dept: ORTHOPEDIC SURGERY | Facility: CLINIC | Age: 20
End: 2025-03-11
Payer: COMMERCIAL

## 2025-03-11 DIAGNOSIS — S43.005D UNSPECIFIED DISLOCATION OF LEFT SHOULDER JOINT, SUBSEQUENT ENCOUNTER: ICD-10-CM

## 2025-03-11 PROCEDURE — 99024 POSTOP FOLLOW-UP VISIT: CPT

## 2025-04-08 ENCOUNTER — APPOINTMENT (OUTPATIENT)
Dept: ORTHOPEDIC SURGERY | Facility: CLINIC | Age: 20
End: 2025-04-08
Payer: COMMERCIAL

## 2025-04-08 VITALS — HEIGHT: 70 IN | BODY MASS INDEX: 22.9 KG/M2 | WEIGHT: 160 LBS

## 2025-04-08 DIAGNOSIS — S43.005D UNSPECIFIED DISLOCATION OF LEFT SHOULDER JOINT, SUBSEQUENT ENCOUNTER: ICD-10-CM

## 2025-04-08 PROCEDURE — 99213 OFFICE O/P EST LOW 20 MIN: CPT

## 2025-05-13 ENCOUNTER — APPOINTMENT (OUTPATIENT)
Dept: ORTHOPEDIC SURGERY | Facility: CLINIC | Age: 20
End: 2025-05-13

## 2025-05-13 VITALS — HEIGHT: 70 IN | WEIGHT: 165 LBS | BODY MASS INDEX: 23.62 KG/M2

## 2025-05-13 DIAGNOSIS — M25.312 OTHER INSTABILITY, LEFT SHOULDER: ICD-10-CM

## 2025-05-13 PROCEDURE — 99213 OFFICE O/P EST LOW 20 MIN: CPT

## 2025-07-17 ENCOUNTER — APPOINTMENT (OUTPATIENT)
Dept: MRI IMAGING | Facility: CLINIC | Age: 20
End: 2025-07-17
Payer: COMMERCIAL

## 2025-07-17 ENCOUNTER — APPOINTMENT (OUTPATIENT)
Dept: ORTHOPEDIC SURGERY | Facility: CLINIC | Age: 20
End: 2025-07-17
Payer: COMMERCIAL

## 2025-07-17 PROBLEM — S43.432D TEAR OF LEFT GLENOID LABRUM, SUBSEQUENT ENCOUNTER: Status: ACTIVE | Noted: 2025-07-17

## 2025-07-17 PROCEDURE — 73221 MRI JOINT UPR EXTREM W/O DYE: CPT | Mod: LT

## 2025-07-17 PROCEDURE — 99213 OFFICE O/P EST LOW 20 MIN: CPT

## 2025-07-17 PROCEDURE — 73030 X-RAY EXAM OF SHOULDER: CPT | Mod: LT

## 2025-07-22 ENCOUNTER — APPOINTMENT (OUTPATIENT)
Dept: ORTHOPEDIC SURGERY | Facility: CLINIC | Age: 20
End: 2025-07-22

## 2025-07-22 ENCOUNTER — APPOINTMENT (OUTPATIENT)
Dept: ORTHOPEDIC SURGERY | Facility: CLINIC | Age: 20
End: 2025-07-22
Payer: COMMERCIAL

## 2025-07-22 DIAGNOSIS — Z98.890 OTHER SPECIFIED POSTPROCEDURAL STATES: ICD-10-CM

## 2025-07-22 DIAGNOSIS — M25.312 OTHER INSTABILITY, LEFT SHOULDER: ICD-10-CM

## 2025-07-22 PROCEDURE — 99214 OFFICE O/P EST MOD 30 MIN: CPT

## 2025-07-31 ENCOUNTER — APPOINTMENT (OUTPATIENT)
Dept: INTERNAL MEDICINE | Facility: CLINIC | Age: 20
End: 2025-07-31
Payer: COMMERCIAL

## 2025-07-31 VITALS
DIASTOLIC BLOOD PRESSURE: 75 MMHG | SYSTOLIC BLOOD PRESSURE: 112 MMHG | HEART RATE: 67 BPM | OXYGEN SATURATION: 98 % | WEIGHT: 160 LBS | TEMPERATURE: 98.3 F | HEIGHT: 70 IN | BODY MASS INDEX: 22.9 KG/M2

## 2025-07-31 DIAGNOSIS — Z00.00 ENCOUNTER FOR GENERAL ADULT MEDICAL EXAMINATION W/OUT ABNORMAL FINDINGS: ICD-10-CM

## 2025-07-31 PROCEDURE — 99385 PREV VISIT NEW AGE 18-39: CPT | Mod: 25

## 2025-07-31 PROCEDURE — 90471 IMMUNIZATION ADMIN: CPT

## 2025-07-31 PROCEDURE — 36415 COLL VENOUS BLD VENIPUNCTURE: CPT

## 2025-07-31 PROCEDURE — 90715 TDAP VACCINE 7 YRS/> IM: CPT

## 2025-08-01 ENCOUNTER — TRANSCRIPTION ENCOUNTER (OUTPATIENT)
Age: 20
End: 2025-08-01

## 2025-08-01 LAB
ALBUMIN SERPL ELPH-MCNC: 4.8 G/DL
ALP BLD-CCNC: 65 U/L
ALT SERPL-CCNC: 13 U/L
ANION GAP SERPL CALC-SCNC: 14 MMOL/L
AST SERPL-CCNC: 17 U/L
BASOPHILS # BLD AUTO: 0.04 K/UL
BASOPHILS NFR BLD AUTO: 0.7 %
BILIRUB SERPL-MCNC: 0.8 MG/DL
BUN SERPL-MCNC: 15 MG/DL
CALCIUM SERPL-MCNC: 10.5 MG/DL
CHLORIDE SERPL-SCNC: 105 MMOL/L
CHOLEST SERPL-MCNC: 149 MG/DL
CO2 SERPL-SCNC: 23 MMOL/L
CREAT SERPL-MCNC: 1.14 MG/DL
EGFRCR SERPLBLD CKD-EPI 2021: 94 ML/MIN/1.73M2
EOSINOPHIL # BLD AUTO: 0.04 K/UL
EOSINOPHIL NFR BLD AUTO: 0.7 %
ESTIMATED AVERAGE GLUCOSE: 100 MG/DL
GLUCOSE SERPL-MCNC: 89 MG/DL
HBA1C MFR BLD HPLC: 5.1 %
HBV CORE IGM SER QL: NONREACTIVE
HBV SURFACE AB SERPL IA-ACNC: <3.3 MIU/ML
HBV SURFACE AG SER QL: NONREACTIVE
HCT VFR BLD CALC: 46.1 %
HDLC SERPL-MCNC: 46 MG/DL
HGB BLD-MCNC: 14.7 G/DL
IMM GRANULOCYTES NFR BLD AUTO: 0.4 %
LDLC SERPL-MCNC: 91 MG/DL
LYMPHOCYTES # BLD AUTO: 1.45 K/UL
LYMPHOCYTES NFR BLD AUTO: 25.4 %
MAN DIFF?: NORMAL
MCHC RBC-ENTMCNC: 31.1 PG
MCHC RBC-ENTMCNC: 31.9 G/DL
MCV RBC AUTO: 97.7 FL
MEV IGG FLD QL IA: 30.1 AU/ML
MEV IGG+IGM SER-IMP: POSITIVE
MONOCYTES # BLD AUTO: 0.41 K/UL
MONOCYTES NFR BLD AUTO: 7.2 %
MUV AB SER-ACNC: POSITIVE
MUV IGG SER QL IA: 57.7 AU/ML
NEUTROPHILS # BLD AUTO: 3.74 K/UL
NEUTROPHILS NFR BLD AUTO: 65.6 %
NONHDLC SERPL-MCNC: 103 MG/DL
PLATELET # BLD AUTO: 306 K/UL
POTASSIUM SERPL-SCNC: 5.1 MMOL/L
PROT SERPL-MCNC: 7.6 G/DL
RBC # BLD: 4.72 M/UL
RBC # FLD: 13.5 %
RUBV IGG FLD-ACNC: 5.18 INDEX
RUBV IGG SER-IMP: POSITIVE
SODIUM SERPL-SCNC: 142 MMOL/L
TRIGL SERPL-MCNC: 53 MG/DL
VZV AB TITR SER: POSITIVE
VZV IGG SER IF-ACNC: 1.33 S/CO
WBC # FLD AUTO: 5.7 K/UL

## 2025-08-04 LAB
M TB IFN-G BLD-IMP: NEGATIVE
QUANTIFERON TB PLUS MITOGEN MINUS NIL: >10 IU/ML
QUANTIFERON TB PLUS NIL: 0.02 IU/ML
QUANTIFERON TB PLUS TB1 MINUS NIL: 0 IU/ML
QUANTIFERON TB PLUS TB2 MINUS NIL: 0 IU/ML

## 2025-08-14 ENCOUNTER — APPOINTMENT (OUTPATIENT)
Dept: INTERNAL MEDICINE | Facility: CLINIC | Age: 20
End: 2025-08-14
Payer: COMMERCIAL

## 2025-08-14 PROCEDURE — 90739 HEPB VACC 2/4 DOSE ADULT IM: CPT

## 2025-08-14 PROCEDURE — G0010: CPT

## 2025-08-18 ENCOUNTER — APPOINTMENT (OUTPATIENT)
Dept: ORTHOPEDIC SURGERY | Facility: CLINIC | Age: 20
End: 2025-08-18

## 2025-08-18 DIAGNOSIS — S43.005D UNSPECIFIED DISLOCATION OF LEFT SHOULDER JOINT, SUBSEQUENT ENCOUNTER: ICD-10-CM

## 2025-08-18 PROCEDURE — 99214 OFFICE O/P EST MOD 30 MIN: CPT
